# Patient Record
(demographics unavailable — no encounter records)

---

## 2025-04-21 NOTE — HISTORY OF PRESENT ILLNESS
[FreeTextEntry1] : 25 year old woman  who is 29 weeks pregnant (LORI 25) comes in for evaluation of HTN She was started on Labetalol 100 mg BID at 19 weeks. No ASA EKG normal Checks BP at home and all normal EKG normal

## 2025-04-21 NOTE — DISCUSSION/SUMMARY
[FreeTextEntry1] : 25 year old woman  who is 29 weeks pregnant (LORI 25) comes in for evaluation of HTN She was started on Labetalol 100 mg BID at 19 weeks. No ASA EKG normal Checks BP at home and all normal EKG normal Check with OB regarding ASA FU in 4-6 weeks [EKG obtained to assist in diagnosis and management of assessed problem(s)] : EKG obtained to assist in diagnosis and management of assessed problem(s)